# Patient Record
(demographics unavailable — no encounter records)

---

## 2024-10-21 NOTE — REVIEW OF SYSTEMS
[No studies available for review at this time.] : No studies available for review at this time. [As Noted in HPI] : as noted in HPI [Negative] : Heme/Lymph

## 2024-10-21 NOTE — PHYSICAL EXAM

## 2024-10-21 NOTE — PHYSICAL EXAM
[Alert] : alert [Normal Voice/Communication] : normal voice/communication [Healthy Appearing] : healthy appearing [No Acute Distress] : no acute distress [Sclera] : the sclera and conjunctiva were normal [Hearing Threshold Finger Rub Not Morrison] : hearing was normal [Normal Lips/Gums] : the lips and gums were normal [Oropharynx] : the oropharynx was normal [Normal Appearance] : the appearance of the neck was normal [No Neck Mass] : no neck mass was observed [No Respiratory Distress] : no respiratory distress [No Acc Muscle Use] : no accessory muscle use [Respiration, Rhythm And Depth] : normal respiratory rhythm and effort [Auscultation Breath Sounds / Voice Sounds] : lungs were clear to auscultation bilaterally [Heart Rate And Rhythm] : heart rate was normal and rhythm regular [Normal S1, S2] : normal S1 and S2 [Murmurs] : no murmurs [Bowel Sounds] : normal bowel sounds [No Masses] : no abdominal mass palpated [Abdomen Tenderness] : non-tender [Abdomen Soft] : soft [] : no hepatosplenomegaly [Oriented To Time, Place, And Person] : oriented to person, place, and time

## 2024-10-28 NOTE — HISTORY OF PRESENT ILLNESS
[FreeTextEntry1] : 40yoM with no significant medical history. Presents today for dysphagia.  Patient states he has been having difficulty swallowing solids for the last 4-5 years. States it happens every time he is swallowing solid foods. States he has to drink water to get it down. Patient states he has a bowel movement every other day and strains to go to the bathroom. States he has acid reflux multiple times a week and does occasionally wake him up from sleep.  Patient denies any abdominal pain, nausea, vomiting, heart burn, unintentional weight loss, diarrhea, melena, hematochezia.   Patient went to the ED last year for food impaction. Patient had EGD to remove the food bolus. Food bolus was noted at the distal esophagus. The food bolus was removed using the [employer] net retrieval devices. Small remnant food particles were pushed into the stomach. No food was was seen in the esophagus at the end of the procedure. Aperistaltic esophagus and widely open lower esophageal sphincter were noted. These findings may suggest possible motility disorder. Grade B esophagitis compatible with non-erosive esophagitis. (Biopsy). Erythema in the stomach compatible with non-erosive gastritis. Normal mucosa in the whole examined duodenum. Pathology revealed + H. pylori, negative for EoE  Labs Reviewed 9/2024 CBC WNL CMP WNL

## 2024-10-28 NOTE — ASSESSMENT
[FreeTextEntry1] : 40yoM with no significant medical history. Presents today for dysphagia.   #Dysphagia #Positive H. Pylori #Acid Reflux #Grade B Esophagitis  s/p EGD in 6/2023 for food bolus impaction -Advised small bites, chewing thoroughly. -Will schedule EGD -risks vs. benefits discussed -Will repeat biopsies to assess for H pylori infection -Will order esophagram -Consider manometry pending above  Follow up after procedures.  Pt agreeable with plan

## 2024-10-28 NOTE — END OF VISIT
[] : Fellow [FreeTextEntry3] :  Pt seen/examined, agree with above findings and plan as outlined by Chel Aaron NP.

## 2024-11-20 NOTE — SOCIAL HISTORY
[House] : [unfilled] lives in a house  [Central Forced Air] : heating provided by central forced air [Central] : air conditioning provided by central unit [Dry] : dry [None] : none [Humidifier] : does not use a humidifier [Dehumidifier] : does not use a dehumidifier [Cockroaches] : Patient states that there are no cockroaches in the home [Dust Mite Covers] : does not have dust mite covers [Feather Pillows] : does not have feather pillows [Feather Comforter] : does not have a feather comforter [Bedroom] : not in the bedroom [Basement] : not in the basement [Living Area] : not in the living area [Smokers in Household] : there are no smokers in the home

## 2024-11-20 NOTE — HISTORY OF PRESENT ILLNESS
[Asthma] : asthma [Allergic Rhinitis] : allergic rhinitis [Eczematous rashes] : eczematous rashes [Food Allergies] : food allergies [None] : The patient is currently asymptomatic [de-identified] :  BARBY HUMPHREYS is a 40 year yo male who had endoscopy done and started taking his medications as prescribed (pantoprazole and doxycycline), after dinner he had a snack about an hour after dinner, soon after the snack he developed itchy all over his body especially around his neck and face. he took Benadryl and went to Urgent care where they gave him more meds and sent him home with a prescription he hasn't picked up yet. He had an episode of hives a few weeks ago and had the same hive reaction, he states this time the hives were much worse. He has had no issues with medications or any foods in the past. He is here to see if allergy is the source of the problem.

## 2024-11-20 NOTE — ASSESSMENT
[FreeTextEntry1] : 1. Urticaria - likely secondary to H.pylori  Positive depression screen: Recommended follow up with PCP.

## 2024-11-27 NOTE — ASSESSMENT
[FreeTextEntry1] : 1. Urticaria - likely secondary to H.pylori - Stable on cetirizine 10mg, Famotidine 20mg, Montelukast

## 2024-11-27 NOTE — HISTORY OF PRESENT ILLNESS
[de-identified] : BARBY HUMPHREYS is a 40 year yo male who had endoscopy done and started taking his medications as prescribed (pantoprazole and doxycycline), after dinner he had a snack about an hour after dinner, soon after the snack he developed itchy all over his body especially around his neck and face. he took Benadryl and went to Urgent care where they gave him more meds and sent him home with a prescription he hasn't picked up yet. He had an episode of hives a few weeks ago and had the same hive reaction, he states this time the hives were much worse. He has had no issues with medications or any foods in the past. He is here to see if allergy is the source of the problem.   No history or symptoms of asthma, allergic rhinitis, eczematous rashes, food allergies.   He is here today for a follow up, he says he has been taking all medication as prescribed; he says since he started the medication he has not broken out in hives, recently he has been experiencing a sore throat and having bitter taste in his mouth, he wants to know if this is allergy related. He has noticed since he started taking all medication he eats more; he wants to know what his next steps are.

## 2024-12-06 NOTE — ASSESSMENT
[FreeTextEntry1] : 41yo male presents for follow up of heartburn and dysphagia s/p EGD, clinically improved   #Esophagitis, LA grade D #H pylori gastritis s/p EGD performed on 10/30/24 revealing LA grade D esophagitis, GEJ ulcer, hiatal hernia, gastritis, path showing H pylori gastritis, no evidence of EOE. Prior EGD 6/2023 revealing food bolus in distal esophagus, grade B esophagitis, path showed h pylori gastritis -Recommend complete quadruple therapy, again discussed correct timing and administration -Continue omeprazole 20mg twice daily pending repeat EGD, to stop pantoprazole  -Antireflux measures discussed including elevating hob and to avoid lying down for 2-3 hours after meals  -Discussed avoidance of dietary triggers including spicy foods, coffee, etoh, citrus, tomatoes, chocolate, mints -Continue NSAID avoidance -Plan to repeat EGD in 4-6 weeks to assess for healing   Follow up after procedures Pt agreeable with plan, advised to contact us if questions/concerns

## 2024-12-06 NOTE — HISTORY OF PRESENT ILLNESS
[Home] : at home, [unfilled] , at the time of the visit. [Medical Office: ([facility])___] : at the medical office located in  [Verbal consent obtained from patient] : the patient, [unfilled] [FreeTextEntry4] : ANNE MARIE BELLO [FreeTextEntry1] : 41yo male presents for follow up of heartburn and dysphagia s/p EGD  EGD performed on 10/30/24 revealing LA grade D esophagitis, GEJ ulcer, hiatal hernia, gastritis, path showing H pylori gastritis, no evidence of EOE EGD 6/2023 revealing food bolus in distal esophagus, grade B esophagitis, path showed h pylori gastritis   Patient states he has been having heartburn and difficulty swallowing solids for the last 4-5 years with occasional nocturnal symptoms. He reports significant improvement since starting PPI and H pylori treatment, almost completed course. He reports bloating and fullness though states he is taking both pantoprazole and omeprazole, had initially switched to omeprazole due to mild bloating but he continued both. No further dysphagia. Denies n/v or abdominal pain. Reports regular bm, no blood in stools. He had developed rash initially when starting antibitoics seen by allergy and taking antihistamines.   Labs Reviewed 9/2024 CBC WNL CMP WNL   [de-identified] : 10/30/24